# Patient Record
Sex: FEMALE | Race: WHITE
[De-identification: names, ages, dates, MRNs, and addresses within clinical notes are randomized per-mention and may not be internally consistent; named-entity substitution may affect disease eponyms.]

---

## 2017-01-20 ENCOUNTER — HOSPITAL ENCOUNTER (OUTPATIENT)
Dept: HOSPITAL 80 - BMCIMAGING | Age: 77
End: 2017-01-20
Attending: GENERAL ACUTE CARE HOSPITAL
Payer: COMMERCIAL

## 2017-01-20 DIAGNOSIS — Z12.31: Primary | ICD-10-CM

## 2017-01-20 PROCEDURE — G0202 SCR MAMMO BI INCL CAD: HCPCS

## 2017-01-20 NOTE — MA
Screening Digital Mammogram



Clinical Indications: Routine screening.



Technique:  Standard cephalocaudal and mediolateral oblique projections are obtained.  This examinati
on is processed by the Klip.inD computer aided detection system. 



Comparison: December 2015, December 2014, December 2013 and November 2012



Breast density: B; There are scattered fibroglandular densities.



Findings: CAD was reviewed. There is subtle new architectural distortion and increasing density in th
e outer left breast seen only on the cc view. The remainder of the left and right breast are stable..




Impression: Subtle developing density outer left breast. 



BI-RADS 0: additional imaging required outer left breast



Recommendation:  Spot compression view in the CC projection. If persistent, proceed to ultrasound for
 further characterization and localization purposes..



[facility] will send a result letter to the patient.



Negative mammography should not preclude additional workup of a clinically suspicious finding.



The patient's information is entered into a reminder system with a target due date for her next mammo
gram.

## 2017-02-07 ENCOUNTER — HOSPITAL ENCOUNTER (OUTPATIENT)
Dept: HOSPITAL 80 - BMCIMAGING | Age: 77
End: 2017-02-07
Attending: GENERAL ACUTE CARE HOSPITAL
Payer: COMMERCIAL

## 2017-02-07 DIAGNOSIS — R92.8: Primary | ICD-10-CM

## 2017-02-07 DIAGNOSIS — Z51.81: ICD-10-CM

## 2017-02-07 DIAGNOSIS — M05.89: ICD-10-CM

## 2017-02-07 PROCEDURE — G0206 DX MAMMO INCL CAD UNI: HCPCS

## 2017-02-07 NOTE — MA
Left diagnostic mammogram



Indication: Subtle new distortion and increasing density outer left breast on the CC view.



Technique: Spot compressed CC and true lateral views of the left breast.



Comparison: Screening mammograms dating back to November 2010



Findings: The distortion completely resolves on the additional views. A benign band of asymmetric tis
slick in the outer left breast has a similar appearance to the 2011 and 2010 mammograms. No underlying 
mass or architectural distortion. A benign lymph node in the deep upper-outer left breast is unchange
d.



Impression: Benign dense superimposed fibroglandular tissue in the upper-outer left breast. No malign
luis. 



BI-RADS 2: Benign finding.



Recommendation: Resume routine annual screening in January 2018, unless otherwise clinically indicate
d. 



Patient was notified of the negative results and recommendations at time of study completion.

## 2017-09-08 ENCOUNTER — HOSPITAL ENCOUNTER (OUTPATIENT)
Dept: HOSPITAL 80 - BMCIMAGING | Age: 77
End: 2017-09-08
Attending: INTERNAL MEDICINE
Payer: COMMERCIAL

## 2017-09-08 DIAGNOSIS — R93.8: ICD-10-CM

## 2017-09-08 DIAGNOSIS — J44.9: Primary | ICD-10-CM

## 2017-09-11 ENCOUNTER — HOSPITAL ENCOUNTER (OUTPATIENT)
Dept: HOSPITAL 80 - CIMAGING | Age: 77
End: 2017-09-11
Attending: INTERNAL MEDICINE
Payer: COMMERCIAL

## 2017-09-11 DIAGNOSIS — K86.9: ICD-10-CM

## 2017-09-11 DIAGNOSIS — R91.8: Primary | ICD-10-CM

## 2017-09-11 DIAGNOSIS — I71.2: ICD-10-CM

## 2017-09-11 DIAGNOSIS — K57.92: ICD-10-CM

## 2017-09-13 ENCOUNTER — HOSPITAL ENCOUNTER (OUTPATIENT)
Dept: HOSPITAL 80 - FIMAGING | Age: 77
End: 2017-09-13
Attending: INTERNAL MEDICINE
Payer: COMMERCIAL

## 2017-09-13 DIAGNOSIS — K86.2: Primary | ICD-10-CM

## 2017-09-13 PROCEDURE — A9585 GADOBUTROL INJECTION: HCPCS

## 2017-09-13 PROCEDURE — 74183 MRI ABD W/O CNTR FLWD CNTR: CPT

## 2017-09-15 ENCOUNTER — HOSPITAL ENCOUNTER (OUTPATIENT)
Dept: HOSPITAL 80 - FSGY | Age: 77
Discharge: HOME | End: 2017-09-15
Payer: COMMERCIAL

## 2017-09-15 VITALS — HEART RATE: 73 BPM

## 2017-09-15 VITALS — OXYGEN SATURATION: 97 % | SYSTOLIC BLOOD PRESSURE: 150 MMHG | DIASTOLIC BLOOD PRESSURE: 76 MMHG

## 2017-09-15 VITALS — TEMPERATURE: 97.7 F

## 2017-09-15 VITALS — RESPIRATION RATE: 19 BRPM

## 2017-09-15 DIAGNOSIS — R93.5: ICD-10-CM

## 2017-09-15 DIAGNOSIS — J44.9: ICD-10-CM

## 2017-09-15 DIAGNOSIS — K29.70: ICD-10-CM

## 2017-09-15 DIAGNOSIS — K86.9: ICD-10-CM

## 2017-09-15 DIAGNOSIS — K86.2: Primary | ICD-10-CM

## 2017-09-15 DIAGNOSIS — M06.9: ICD-10-CM

## 2017-09-15 DIAGNOSIS — F17.210: ICD-10-CM

## 2017-09-15 DIAGNOSIS — K22.8: ICD-10-CM

## 2017-09-15 DIAGNOSIS — K31.7: ICD-10-CM

## 2017-09-15 PROCEDURE — 0DB98ZX EXCISION OF DUODENUM, VIA NATURAL OR ARTIFICIAL OPENING ENDOSCOPIC, DIAGNOSTIC: ICD-10-PCS

## 2017-09-15 PROCEDURE — 0DB68ZX EXCISION OF STOMACH, VIA NATURAL OR ARTIFICIAL OPENING ENDOSCOPIC, DIAGNOSTIC: ICD-10-PCS

## 2017-09-15 PROCEDURE — 0F9G4ZX DRAINAGE OF PANCREAS, PERCUTANEOUS ENDOSCOPIC APPROACH, DIAGNOSTIC: ICD-10-PCS

## 2017-09-15 PROCEDURE — 0DB38ZX EXCISION OF LOWER ESOPHAGUS, VIA NATURAL OR ARTIFICIAL OPENING ENDOSCOPIC, DIAGNOSTIC: ICD-10-PCS

## 2017-09-15 NOTE — PDGENHP
History & Physical


Chief Complaint: pancreatic cyst


History of Present Illness: 76 year old female presents for evaluation of a 

pancreatic cyst


Pertinent Past, Social, Family History: PMHx: COPD, RA.  FaMHx: No pancreatic 

cancer


Relevant Physical Exam: HEENT: Anicteric.  CV: RRR +s1s2.  Lungs: CTAB No w/r/

r.  Abd: soft, nt, + BS


Cardiorespiratory Assessment: ASA 3

## 2017-09-15 NOTE — GIREPORT
[facility]

Surgical Services - Endoscopy Department

_______________________________________________________________________________________________

Patient Name: Mare Puentes                     Procedure Date: 9/15/2017 9:55 AM

MRN: [id number]                                Account Number: [id number]

Patient Type: Outpatient                      Attending  MD/ ER Physician: Robert Bender MD

_______________________________________________________________________________________________

 

Procedure:                    Upper EUS

Indications:                  Pancreatic cyst on MRI

Patient Profile:              76 year old female presents for evaluation of pancreatic cyst.

Providers:                    Robert Bender MD

Medicines:                    Monitored Anesthesia Care

Complications:                No immediate complications. Estimated blood loss: Minimal.

_______________________________________________________________________________________________

Findings:                     Endoscopic Finding :

                              The Z-line was irregular and was found at the gastroesophageal 

                              junction.

                              Patchy mildly erythematous mucosa was found in the gastric body 

                              and in the gastric antrum. Biopsies were taken with a cold 

                              forceps for histology.

                              A few diminutive sessile polyps were found on the greater 

                              curvature of the stomach.

                              A single large sessile polyp was found in the second portion of 

                              the duodenum. The polyp was 2.5cm x 4.0cm. Biopsies were taken 

                              with a cold forceps for histology.

                              Endosonographic Finding :

                              An anechoic lesion suggestive of a cyst was identified in the 

                              pancreatic head. It does not communicate with the pancreatic 

                              duct. The lesion measured 25 mm by 40 mm in maximal 

                              cross-sectional diameter. There was a single compartment thinly 

                              septated. The outer wall of the lesion was thin. There was 

                              internal debris within the fluid-filled cavity. Fine needle 

                              aspiration for cytology was performed. Color Doppler imaging 

                              was utilized prior to needle puncture to confirm a lack of 

                              significant vascular structures within the needle path. One 

                              pass was made with the 25 gauge needle using a transduodenal 

                              approach. Approc A stylet was used. A cytologist was present 

                              and performed a preliminary cytologic examination. Final 

                              cytology results are pending.

                              Pancreatic parenchymal abnormalities were noted in the entire 

                              pancreas. These consisted of hyperechoic foci. A few dilated 

                              side branches were noted. The pancreatic duct wall was not 

                              hyperechoic.

                              There was no sign of significant endosonographic abnormality in 

                              the entire main bile duct. The maximum diameter of the duct was 

                              4 mm. No cysts and no stones were identified.

                              No lymphadenopathy seen.

                                                                                               

Estimated Blood Loss:         Estimated blood loss was minimal.

Post Op Diagnosis:            - Z-line irregular, at the gastroesophageal junction.

                              - Erythematous mucosa in the gastric body and antrum. Biopsied.

                              - A few gastric polyps.

                              - A single duodenal polyp. Biopsied and NOT removed. Will need 

                              endoscopic mucosal resection dependent on biopsy results.

                              - A cystic lesion was seen in the pancreatic head. Fine needle 

                              aspiration performed.

                              - Pancreatic parenchymal abnormalities consisting of 

                              hyperechoic foci were noted in the entire pancreas.

                              - There was no sign of significant pathology in the entire main 

                              bile duct.

Recommendation:               - Discharge patient to home (with escort).

                              - Await cytology results and await path results.

                              - Perform an upper GI endoscopy in 6 weeks.

                              - Await pathology results.

                              - Clear liquid diet.

                              - Ciprofloxacin 500 mg PO BID x 5 days.

                              - Thagerardo you for allowing me to participate in the care

                                                                                               

Attending Participation:

     I personally performed the entire procedure.

                                                                                               

 

Robert Bender MD

_____________

Robert Bender MD

9/15/2017 11:02:52 AM

Number of Addenda: 0

 

Note Initiated On: 9/15/2017 9:55 AM

Total Procedure Duration Time 0 hours 32 minutes 11 seconds 

[email or web address]

## 2017-09-15 NOTE — PDANEPAE
ANE History of Present Illness


77 yo F w pancreatic mass here for EGD/EUS








ANE Past Medical History





- Cardiovascular History


Hx Hypertension: No


Hx Arrhythmias: No


Hx Chest Pain: No


Hx Coronary Artery / Peripheral Vascular Disease: No


Hx CHF / Valvular Disease: No


Hx Palpitations: No


Cardiovascular History Comment: BULGE IN AORTA ON CT 9-11-17 - FU IN 6 MOS





- Pulmonary History


Hx COPD: Yes


Hx Asthma/Reactive Airway Disease: No


Hx Recent Upper Respiratory Infection: No


Hx Oxygen in Use at Home: No


Hx Sleep Apnea: No


Sleep Apnea Screening Result - Last Documented: Negative


Pulmonary History Comment: DX COPD 5 YRS AGO- WELL MANAGED W/COMBIVENT.  

PINPOINT SPOTS IN LUNGS PER CT SCAN 9-11-17





- Neurologic History


Hx Cerebrovascular Accident: No


Hx Seizures: No


Hx Dementia: No





- Endocrine History


Hx Diabetes: No


Endocrine History Comment: PANCREAS NODULE ON CT 9-11-17





- Renal History


Hx Renal Disorders: Yes


Renal History Comment: FREQUENCY





- Liver History


Hx Hepatic Disorders: No





- Neurological & Psychiatric Hx


Hx Neurological and Psychiatric Disorders: No





- Cancer History


Hx Cancer: No





- Congenital Disorder History


Hx Congenital Disorders: No





- GI History


Hx Gastrointestinal Disorders: No





- Other Health History


Other Health History: ORAL LESION.  RA ON PREDNISONE AND METHOTREXATE





- Chronic Pain History


Chronic Pain: No





- Surgical History


Prior Surgeries: HYSTERECTOMY.  LAP HILTON.  RICKETTS'S NEUROMA.  BREAST BX





ANE Review of Systems


Review of Systems: 








- Exercise capacity


METS (RN): 4 METS





ANE Patient History





- Allergies


Allergies/Adverse Reactions: 








bacitracin [From Neosporin ([id number]-[id number])] Allergy (Verified 09/12/17 15:17)


 


bacitracin zinc [From Neosporin ([id number]-[id number]-rjfov)] Allergy (Verified 09/12/17 15:

17)


 


neomycin sulfate [From Neosporin ([id number]-ejb-rfigi)] Allergy (Verified 09/12/17 15:

17)


 Other-Enter Comments


NSAIDS (Non-Steroidal Anti-Inflamma Allergy (Verified 09/12/17 15:17)


 Anaphylaxis


polymyxin B [From Neosporin (har-njs-iidvz)] Allergy (Verified 09/12/17 15:17)


 








- Home Medications


Home Medications: 








Combivent Respimat Inhal Spray(*)  09/12/17 [Last Taken 09/15/17 08:55]


Methotrexate  09/12/17 [Last Taken 09/09/17]


Prednisone  09/12/17 [Last Taken 09/13/17]


Z-Sleep  09/12/17 [Last Taken 09/13/17]








- NPO status


NPO Since - Liquids (Date): 09/14/17


NPO Since - Liquids (Time): 23:00


NPO Since - Solids (Date): 09/14/17


NPO Since - Solids (Time): 19:30





- Anes Hx


Anes Hx: no prior problems





- Smoking Hx


Smoking Status: Heavy smoker (60 pack years)





- Alcohol Use


Alcohol Use: None





- Family Anes Hx


Family Anes Hx: none





ANE Labs/Vital Signs





- Vital Signs


Blood Pressure: 170/90


Heart Rate: 78


Respiratory Rate: 20


O2 Sat (%): 96


Height: 172.72 cm


Weight: 70.76 kg





ANE Physical Exam





- Airway


Neck exam: FROM


Mallampati Score: Class 2


Mouth exam: poor dentition, dentures


Mouth image: 


  __________________________














  __________________________





 1 - missing





 2 - missing








- Pulmonary


Pulmonary: no respiratory distress, expiratory wheeze





- Cardiovascular


Cardiovascular: regular rate and rhythym





- ASA Status


ASA Status: III





ANE Anesthesia Plan


Anesthesia Plan: general endotracheal anesthesia (backup), GA with mask

## 2017-09-18 LAB — Lab: 86 U/L

## 2018-01-24 ENCOUNTER — HOSPITAL ENCOUNTER (OUTPATIENT)
Dept: HOSPITAL 80 - BMCIMAGING | Age: 78
End: 2018-01-24
Attending: INTERNAL MEDICINE
Payer: COMMERCIAL

## 2018-01-24 DIAGNOSIS — Z12.31: Primary | ICD-10-CM

## 2018-03-13 ENCOUNTER — HOSPITAL ENCOUNTER (OUTPATIENT)
Dept: HOSPITAL 80 - FSGY | Age: 78
Setting detail: OBSERVATION
LOS: 1 days | Discharge: HOME | End: 2018-03-14
Attending: INTERNAL MEDICINE | Admitting: FAMILY MEDICINE
Payer: COMMERCIAL

## 2018-03-13 DIAGNOSIS — K44.9: ICD-10-CM

## 2018-03-13 DIAGNOSIS — K92.1: Primary | ICD-10-CM

## 2018-03-13 DIAGNOSIS — K64.8: ICD-10-CM

## 2018-03-13 DIAGNOSIS — J44.9: ICD-10-CM

## 2018-03-13 DIAGNOSIS — K26.9: ICD-10-CM

## 2018-03-13 DIAGNOSIS — D62: ICD-10-CM

## 2018-03-13 DIAGNOSIS — R42: ICD-10-CM

## 2018-03-13 DIAGNOSIS — Z98.890: ICD-10-CM

## 2018-03-13 DIAGNOSIS — F17.210: ICD-10-CM

## 2018-03-13 LAB
INR PPP: 1.08 (ref 0.83–1.16)
PLATELET # BLD: 183 10^3/UL (ref 150–400)
PROTHROMBIN TIME: 14.2 SEC (ref 12–15)

## 2018-03-13 PROCEDURE — G0378 HOSPITAL OBSERVATION PER HR: HCPCS

## 2018-03-13 PROCEDURE — 43235 EGD DIAGNOSTIC BRUSH WASH: CPT

## 2018-03-13 PROCEDURE — 45330 DIAGNOSTIC SIGMOIDOSCOPY: CPT

## 2018-03-13 PROCEDURE — G0379 DIRECT REFER HOSPITAL OBSERV: HCPCS

## 2018-03-13 RX ADMIN — PANTOPRAZOLE SODIUM SCH MG: 40 INJECTION, POWDER, FOR SOLUTION INTRAVENOUS at 20:00

## 2018-03-13 NOTE — GHP
[f rep st]



                                                            HISTORY AND PHYSICAL





DATE OF ADMISSION:  03/13/2018



CHIEF COMPLAINT:  Blood in stool.



HISTORY OF PRESENT ILLNESS:  This is a 77-year-old female, who underwent an EGD with endoscopic remov
al of a duodenal polyp last week.  Presented to the hospital today after she had an episode of hemato
chezia and the feeling of lightheadedness this morning. 



Last night the patient had some chili, which she felt did not agree with her.  She vomited multiple t
imes last night.  Her vomitus was described as nonbloody and did not have any appearance of coffee-gr
ound emesis.  This morning, she developed some bright red blood per rectum and felt lightheaded.  Brian
arently she was sent directly to the endoscopy suite, where she underwent an EGD and flex sig by Dr. Fink.  The flex sig revealed red maroon stool in the rectum as well as internal hemorrhoids.  The 
EGD showed 1 nonobstructing, nonbleeding duodenal ulcer with pigmented material and stigmata of recen
t bleeding.  During the time of my exam, the patient was seen in the postoperative area.  She is deny
ing any abdominal pain.  She is no longer feeling nauseous.



PAST MEDICAL HISTORY:  

1.  Tobacco abuse.

2.  Chronic obstructive pulmonary disease.



PAST SURGICAL HISTORY:  Cholecystectomy.



HOME MEDICATIONS:  Refer to DLS for details.



ALLERGIES:  Bacitracin, NSAIDs, neomycin, polymyxin.



SOCIAL HISTORY:  The patient smokes.  She denies any current alcohol use.  She denies any illicit balta
g use.



FAMILY HISTORY:  Significant for mother with multiple intestinal surgeries, possible colon cancer.



REVIEW OF SYSTEMS:  Comprehensive 10-point review of systems was done and is negative, except for as 
mentioned in the HPI.



PHYSICAL EXAMINATION:  VITAL SIGNS:  Blood pressure 133/65, pulse of 74, respiratory rate 21, O2 sat 
100% on 2 L, temperature afebrile.  GENERAL:  No acute distress.  HEAD:  Normocephalic, atraumatic.  
EYES:  PERRLA.  Sclerae anicteric.  MOUTH:  Moist mucous membranes.  NECK:  Supple.  No lymphadenopat
hy.  CARDIOVASCULAR:  S1-S2.  No JVD.  No lower extremity edema.  PULMONARY:  Lungs are clear to ausc
ultation bilaterally.  No wheezes, rales, or rhonchi.  ABDOMEN:  Soft, nontender, nondistended.  No g
uarding or rebound tenderness.  Normoactive bowel sounds.  EXTREMITIES:  No clubbing or cyanosis.  NE
URO:  Cranial nerves 2-12 grossly intact.  No focal motor or sensory deficits.  SKIN:  Without rash.



DIAGNOSTICS:  WBC is 11.1, hemoglobin 12.8, hematocrit 37.6, down from a crit of 49.3 on 02/27/2018. 
 Sodium 144, potassium 4, chloride 107, BUN 27, creatinine 0.5, glucose 82.



ASSESSMENT:  This is a 77-year-old female who underwent esophagogastroduodenoscopy and endoscopic rem
oval of a duodenal polyp last week, presenting with suspected acute gastrointestinal bleed with acute
 blood loss anemia.



PLAN:  Once again, the patient has already undergone EGD and sigmoidoscopy.  At this point, she will 
be observed and closely monitored for further bleeding.  We will start Protonix 40 mg IV b.i.d. and c
heck a type and cross.  We will also obtain baseline coags.  If she continues to be hemodynamically s
table and not bleeding, she can likely be discharged in the morning. 



Chemical DVT prophylaxis is contraindicated in the setting of bleeding.  We will order SCDs.





Job #:  [id number]

## 2018-03-13 NOTE — POSTANESTH
Post Anesthetic Evaluation


Cardiovascular Status: Normal, Stable, Similar to Pre-Op Cond


Respiratory Status: Similar to Pre-op Cond.


Level of Consciousness/Mental Status: Can Participate in Eval


Pain Control: Adequate, Prn Tx Ordered


Nausea/Vomiting Control: Adequate, Prn Tx Ordered


Complications Possibly Related to Anesthesia: None Noted

## 2018-03-13 NOTE — GIREPORT
[facility]

Surgical Services - Endoscopy Department

_____________________________________________________________________________________________________
_______

Patient Name: Mare Puentes                            Procedure Date: 3/13/2018 11:23 AM

MRN: [id number]                                       Account Number: [id number]

Patient Type: Outpatient                             Attending  MD/ ER Physician: Rios Fink MD


_____________________________________________________________________________________________________
_______

 

Procedure:                    Flexible Sigmoidoscopy

Indications:                  Hematochezia, Melena

Providers:                    Rios Fink MD

Medicines:                    Sedation Required Anesthesia Staff Assistance

Complications:                No immediate complications.

Description of Procedure:     After obtaining informed consent, the endoscope was passed under direct
 

                              vision. Throughout the procedure, the patient's blood pressure, pulse, 
and 

                              oxygen saturations were monitored continuously. The Colonoscope was 

                              introduced through the anus and advanced to the rectum. The flexible 

                              sigmoidoscopy was accomplished without difficulty. The patient tolerate
d the 

                              procedure well. The quality of the bowel preparation was adequate.

Findings:                     Internal hemorrhoids were found during retroflexion. The hemorrhoids we
re 

                              medium-sized.

                              Red maroon blood was found in the rectum.

Estimated Blood Loss:         Estimated blood loss: none.

Post Op Diagnosis:            - Internal hemorrhoids.

                              - Blood in the rectum.

                              - No specimens collected.

Recommendation:               - Patient being admitted for observation and managment of an UGI bleed.


                              - Thank you for allowing me to participate in the care of your patient.


Attending Participation:

     I personally performed the entire procedure.

 

Rios Fink MD

__________________

Rios Fink MD

3/13/2018 11:38:58 AM

This report has been signed electronicallyStalonso Fink MD

Number of Addenda: 0

 

Note Initiated On: 3/13/2018 11:23 AM

Total Procedure Duration Time 0 hours 0 minutes 53 seconds 

[email or web address]

## 2018-03-13 NOTE — GCON
[f rep st]



                                                                    CONSULTATION





DATE OF CONSULTATION:  03/13/2018



CHIEF COMPLAINT:  Hematochezia, lightheadedness.



HISTORY OF PRESENT ILLNESS:  This 77-year-old woman had been directed to the emergency department.  S
he was sent up to endoscopy directly.  She had undergone an upper endoscopy last week with Dr. Bender. 
 She had a large duodenal polyp that was adenomatous.  She had this snare excised with mucosal resect
ion.  Last evening, she felt unwell, she had some nausea and vomiting.  She did not vomit any hematem
esis.  She denies any bright red blood or coffee-ground material.  She felt unwell most of the night.
  In the morning, she had 2 episodes of bright red blood per rectum. She was feeling somewhat lighthe
aded and dizzy.  She was directed to go to the emergency department by our office.  She presented up 
to endoscopy.  She was not tachycardic in endoscopy, had normal blood pressure.  Her hematocrit was s
omewhat low at 37% with a mildly elevated BUN and normal creatinine suggesting upper GI bleed.  It wa
s recommended she go directly to the endoscopy unit.



PAST MEDICAL HISTORY:  Remarkable for tobacco abuse, COPD.



PAST SURGICAL HISTORY:  Remarkable cholecystectomy.



ALLERGIES:  Bactrim, NSAIDs, neomycin, polymyxin.



SOCIAL HISTORY:  She is a nonsmoker.  No significant alcohol or illicit drugs.



FAMILY HISTORY:  Possible colon cancer in her mother.  Otherwise, negative per chief complaint.



MEDICATIONS:  Patient describes taking prednisone, methotrexate and inhaler as an outpatient.



REVIEW OF SYSTEMS:  Negative 10 systems other than mentioned HPI.



PHYSICAL EXAM:  VITAL SIGNS:  129/72, pulse of 75, respiratory rate 21, temperature 36.5 Celsius.  GE
NERAL: Very pleasant woman in no acute distress.  HEENT:  Normocephalic, atraumatic.  EOMI.  NECK:  S
upple.  No cervical adenopathy.  LUNGS:  Clear.  CARDIAC: Normal S1, S2 without murmur.  ABDOMEN:  So
ft, benign nontender, no hepatosplenomegaly.  EXTREMITIES:  Without clubbing, cyanosis, edema.  SKIN:
  Warm, dry, intact.  NEURO:  Nonfocal.  PSYCH: Normal affect.  Oriented to person, place, and time.



LABORATORY DATA:  White count 11.1, hemoglobin 12.8, hematocrit 37.6. Serum chemistries:  Serum sodiu
m 144, potassium 4.0, chloride 107, CO2 28, BUN 27, creatinine 0.9.



IMPRESSION:  A 77-year-old woman who has had a recent large polypectomy in the duodenum now presents 
with some symptoms of lightheadedness, dizziness and reported passage of blood per rectum.  She descr
ibed bright red blood per rectum.  However, she does have a slight drop in her hematocrit with elevat
ed BUN of 27, suggesting a possible upper GI bleed.  Given recent polypectomy in the duodenum, would 
recommend urgent upper endoscopy.  We will also proceed with flexible sigmoidoscopy at the time of up
per endoscopy.



RECOMMENDATIONS:  

1.  Proceed with urgent upper endoscopy and flexible sigmoidoscopy.

2.  Anticipate admission to the hospital for observation.





Job #:  [id number]

## 2018-03-13 NOTE — GIREPORT
[facility]

Surgical Services - Endoscopy Department

_____________________________________________________________________________________________________
_______

Patient Name: Mare Puentes                            Procedure Date: 3/13/2018 11:09 AM

MRN: [id number]                                       Account Number: [id number]

Patient Type: Outpatient                             Attending  MD/ ER Physician: Rios Fink MD


_____________________________________________________________________________________________________
_______

 

Procedure:                    Upper GI endoscopy

Indications:                  Acute post hemorrhagic anemia, Hematochezia, Recent EGD with duodenal 

                              polypectomy.

Providers:                    Rios Fink MD

Medicines:                    Sedation Required Anesthesia Staff Assistance

Complications:                No immediate complications.

Description of Procedure:     After obtaining informed consent, the endoscope was passed under direct
 

                              vision. Throughout the procedure, the patient's blood pressure, pulse, 
and 

                              oxygen saturations were monitored continuously. The Endoscope was intro
duced 

                              through the mouth, and advanced to the second part of duodenum. The [facility]
er GI 

                              endoscopy was accomplished without difficulty. The patient tolerated th
e 

                              procedure well.

Findings:                     The examined esophagus was normal.

                              A small hiatal hernia was present.

                              The entire examined stomach was normal. No bleed see in the stomach.

                              One non-obstructing non-bleeding cratered duodenal ulcer with pigmented
 

                              material was found in the second portion of the duodenum. The lesion wa
s 20 

                              mm in largest dimension.

Estimated Blood Loss:         Estimated blood loss: none.

Post Op Diagnosis:            - Normal esophagus.

                              - Normal stomach.

                              - One non-obstructing non-bleeding duodenal ulcer with pigmented materi
al. 

                              Stigmata of recent bleeding.

                              - No specimens collected.

Recommendation:               - Recommend admission to the hosptial for observation in setting of acu
te 

                              GIB.

                              - Serial H and H

                              - Use Protonix (pantoprazole) 40 mg IV BID.

                              - Clear liquid diet.

                              - Type and Screen

                              - Thank you for allowing me to participate in the care of your patient.


Attending Participation:

     I personally performed the entire procedure.

 

Rios Fink MD

__________________

Rios Fink MD

3/13/2018 11:35:33 AM

This report has been signed electronicallyStalonso Fink MD

Number of Addenda: 0

 

Note Initiated On: 3/13/2018 11:09 AM

[email or web address]

## 2018-03-13 NOTE — PDANEPAE
ANE History of Present Illness





EGD





ANE Past Medical History





- Cardiovascular History


Hx Hypertension: No


Hx Arrhythmias: No


Hx Chest Pain: No


Hx Coronary Artery / Peripheral Vascular Disease: No


Hx CHF / Valvular Disease: No


Hx Palpitations: No


Cardiovascular History Comment: BULGE IN AORTA ON CT 9-11-17 - FU IN 6 MOS





- Pulmonary History


Hx COPD: Yes


Hx Asthma/Reactive Airway Disease: No


Hx Recent Upper Respiratory Infection: No


Hx Oxygen in Use at Home: No


Hx Sleep Apnea: No


Pulmonary History Comment: DX COPD 5 YRS AGO- WELL MANAGED W/COMBIVENT.  

PINPOINT SPOTS IN LUNGS PER CT SCAN 9-11-17





- Neurologic History


Hx Cerebrovascular Accident: No


Hx Seizures: No


Hx Dementia: No





- Endocrine History


Hx Diabetes: No


Endocrine History Comment: PANCREAS NODULE ON CT 9-11-17





- Renal History


Hx Renal Disorders: Yes


Renal History Comment: FREQUENCY





- Liver History


Hx Hepatic Disorders: No





- Neurological & Psychiatric Hx


Hx Neurological and Psychiatric Disorders: No





- Cancer History


Hx Cancer: No





- Congenital Disorder History


Hx Congenital Disorders: No





- GI History


Hx Gastrointestinal Disorders: Yes


Gastrointestinal History Comment: small bowel polp





- Other Health History


Other Health History: ORAL LESION.  RA ON PREDNISONE AND METHOTREXATE





- Chronic Pain History


Chronic Pain: No





- Surgical History


Prior Surgeries: HYSTERECTOMY.  LAP HILTON.  RICKETTS'S NEUROMA.  BREAST BX.  polp 

on small bowel removing in parts





ANE Review of Systems


Review of systems is: negative


Review of Systems: 








- Exercise capacity


METS (RN): 4 METS





ANE Patient History





- Allergies


Allergies/Adverse Reactions: 








bacitracin [From Neosporin (cpy-rfl-ctgek)] Allergy (Verified 09/12/17 15:17)


 


bacitracin zinc [From Neosporin (uqf-bzx-xwfku)] Allergy (Verified 09/12/17 15:

17)


 


neomycin sulfate [From Neosporin (twx-xno-oxgnw)] Allergy (Verified 09/12/17 15:

17)


 Other-Enter Comments


NSAIDS (Non-Steroidal Anti-Inflamma Allergy (Verified 09/12/17 15:17)


 Anaphylaxis


polymyxin B [From Neosporin (hza-mvi-ayuse)] Allergy (Verified 09/12/17 15:17)


 








- Home Medications


Home medications: home medication list seen and reviewed


Home Medications: 








Combivent Respimat Inhal Spray(*)  09/12/17 [Last Taken 03/13/18 08:30]


Methotrexate  09/12/17 [Last Taken 03/10/18]


Prednisone  09/12/17 [Last Taken 03/12/18]


Z-Sleep  09/12/17 [Last Taken 03/12/18]








- NPO status


NPO Since - Liquids (Date): 03/13/18


NPO Since - Liquids (Time): 09:00


NPO Since - Solids (Date): 03/12/18


NPO Since - Solids (Time): 19:00





- Anes Hx


Anes Hx: post operative nausea and vomiting





- Smoking Hx


Smoking Status: Heavy smoker





- Family Anes Hx


Family Anes Hx: none





ANE Labs/Vital Signs





- Labs


Result Diagrams: 


 03/13/18 10:43





 03/13/18 10:43





- Vital Signs


Height: 172.72 cm


Weight: 70.307 kg





ANE Physical Exam





- Airway


Neck exam: FROM


Mallampati Score: Class 2


Mouth exam: dentures





- Pulmonary


Pulmonary: no respiratory distress





- Cardiovascular


Cardiovascular: regular rate and rhythym





- ASA Status


ASA Status: III





ANE Anesthesia Plan


Total IV Anesthesia: Yes

## 2018-03-13 NOTE — PDGENHP
History & Physical


Chief Complaint: CC: Hematochezia


History of Present Illness: 77 year old women with recent EGD with endoscopic 

removal of duodenal polyp. Patient with hematochezia and light headedness. 

Patient was instructed to go to ER for evaluation. Patient was sent to Endo 

directly.  Patient was hemodynamically stable in Endo.  Patient presents now 

for EGD and flex sig to evaluate bleeding.


Pertinent Past, Social, Family History: HTN


Relevant Physical Exam: Lungs Clear Cardiac Normal

## 2018-03-14 VITALS
TEMPERATURE: 98 F | DIASTOLIC BLOOD PRESSURE: 63 MMHG | HEART RATE: 82 BPM | RESPIRATION RATE: 14 BRPM | SYSTOLIC BLOOD PRESSURE: 144 MMHG | OXYGEN SATURATION: 100 %

## 2018-03-14 RX ADMIN — PANTOPRAZOLE SODIUM SCH MG: 40 INJECTION, POWDER, FOR SOLUTION INTRAVENOUS at 08:21

## 2018-03-14 NOTE — ASMTCMCOM
CM Note

 

CM Note                       

Notes:

Spoke w/RN, anticipate will dc home independent when medically stable. CM available for any 

changes.



DC Plan: Independent

 

Date Signed:  03/14/2018 10:06 AM

Electronically Signed By:Cleo Osuna RN

## 2018-03-14 NOTE — HOSPPROG
Hospitalist Progress Note


Assessment/Plan: 





patient is a 76 y/o admitted due to concerns of blood in her stool.  Reviewed 

her care w Dr Fink.





*GI bleed


-h/h overall stable, have trended down a bit


-PPI bid x 2 weeks, then daily x 6 weeks


-OP colonoscopy





*nicotine dependence


-cessation recommended, patch





*COPD





*plan; dc home, further f/u with Dr Bender


Subjective: Mare is feeling well, no complaints.


Objective: 


 Vital Signs











Temp Pulse Resp BP Pulse Ox


 


 36.7 C   76   18   147/81 H  94 


 


 03/14/18 07:18  03/14/18 07:18  03/14/18 07:18  03/14/18 07:18  03/14/18 07:18








 Laboratory Results





 03/14/18 05:55 





 03/13/18 10:43 





 











 03/13/18 03/14/18 03/15/18





 05:59 05:59 05:59


 


Intake Total  1000 


 


Output Total  0 


 


Balance  1000 








 











PT  14.2 SEC (12.0-15.0)   03/13/18  14:15    


 


INR  1.08  (0.83-1.16)   03/13/18  14:15    














- Physical Exam


Constitutional: no apparent distress, appears nourished, not in pain


Eyes: PERRL


Ears, Nose, Mouth, Throat: hearing normal


Cardiovascular: regular rate and rhythym


Respiratory: no respiratory distress


Gastrointestinal: normoactive bowel sounds


Skin: warm


Musculoskeletal: full muscle strength


Neurologic: AAOx3


Psychiatric: interacting appropriately





ICD10 Worksheet


Patient Problems: 


 Problems











Problem Status Onset


 


Melena Acute

## 2018-03-14 NOTE — GDS
[f 
rep st]



                                                             DISCHARGE SUMMARY





DISCHARGE DIAGNOSES:  

1.  Likely upper gastrointestinal bleed.

2.  Anemia due to this.

3.  Nicotine dependence.

4.  Chronic obstructive pulmonary disease.



HISTORY:  Briefly, the patient is a 77-year-old female, who underwent EGD with 
endoscopic removal of a duodenal polyp last week.  She presented to the 
hospital after she had an episode of hematochezia and the feeling of 
lightheadedness in the morning.  She underwent an EGD and flex sig by Dr. Luciano.  The flex sig revealed maroon stool in the rectum, as well as internal 
hemorrhoids.  The EGD showed a nonobstructing, nonbleeding duodenal ulcer.  She 
was monitored overnight.  Her hemoglobin and hematocrit trended down, but have 
been stable.  The plan is for her to be on a PPI daily for 2 weeks, and then 
daily x6 weeks, and further follow up with Dr. Bender in the outpatient setting.



HOSPITAL COURSE BY PROBLEM:  

1.  Upper GI bleed.  She will stay here through the morning to make sure she is 
feeling okay.  Vital signs are stable.  Hemoglobin and hematocrit have trended 
down, but are not worsen.  Will recommend that she see her primary care 
provider and get a hemoglobin and hematocrit next week.  She will be on a PPI 
b.i.d. x2 weeks, then daily x6 weeks.  She needs an outpatient colonoscopy.  I 
discussed this with her.

2.  Nicotine dependence.  Cessation recommended.  She has a patch on.

3.  COPD, not on oxygen.  Overall stable.



DISCHARGE CONDITION:  Stable.  Blood pressure is 147/81, heart rate is 76, 
respiratory rate is 18, O2 sats on room air 94%, temperature 36.7 Celsius.



MEDICATIONS AT DISCHARGE:  Please see the EMR.



DISCHARGE INSTRUCTIONS:  

1.  Recommending she talk to Dr. Bender in regard about restarting her prednisone 
and her methotrexate.

2.  If she develops any further bleeding, to return to the ER.



Copy requested to:

Dr. Bender



Job #:  [id number]

MTDD

## 2018-03-14 NOTE — SOAPPROG
SOAP Progress Note


Assessment/Plan: 


Assessment:





Patient has an episode of dark red stool yesterday. Was described as a small 

amount.  Patient without abdominal pain, no light headiness or dizziness.





She had a slight drop in Hct since admission. 








Plan:





1. Advance to regular diet





2. Pantoprazole 40 mg PO BID x 2 weeks then 40 mg a day x 6 weeks





3. Ok for discharge home later today.  Will need follow up with Dr. Bender.





4. I would recommend an outpatient colonoscopy at some point in the near 

future. 





03/14/18 08:41





Subjective: 





CC: GI Bleed





Patient without pain. Had one episode of dark red stool since admission. No 

abdominal pain, no light headedness. 


Objective: 





 Vital Signs











Temp Pulse Resp BP Pulse Ox


 


 36.7 C   76   18   147/81 H  94 


 


 03/14/18 07:18  03/14/18 07:18  03/14/18 07:18  03/14/18 07:18  03/14/18 07:18








 Laboratory Results





 03/14/18 05:55 





 03/13/18 10:43 





 











 03/13/18 03/14/18 03/15/18





 05:59 05:59 05:59


 


Intake Total  1000 


 


Output Total  0 


 


Balance  1000 








 











PT  14.2 SEC (12.0-15.0)   03/13/18  14:15    


 


INR  1.08  (0.83-1.16)   03/13/18  14:15    














 











Generic Name Dose Route Start Last Admin





  Trade Name Freq  PRN Reason Stop Dose Admin


 


Acetaminophen  650 mg  03/13/18 12:32  





  Tylenol  PO  09/09/18 12:31  





  Q6 PRN   





  Pain, Mild/Fever, Can Take PO   


 


Albuterol/Ipratropium  1 inh  03/14/18 09:00  





  Combivent Respimat Inhal [place]  IH  09/10/18 08:59  





  BID ASHLEY   


 


Diphenhydramine HCl  25 mg  03/13/18 21:02  03/14/18 00:08





  Benadryl  PO  09/09/18 21:01  25 mg





  HS PRN   Administration





  Sleep/Insomnia   


 


Ondansetron HCl  4 mg  03/13/18 12:32  





  Zofran  IVP  09/09/18 12:31  





  Q4 PRN   





  Nausea/Vomiting, Can't Take PO   


 


Pantoprazole Sodium  40 mg  03/13/18 21:00  03/14/18 08:21





  Protonix  IVP  09/09/18 20:59  40 mg





  BID ASHLEY   Administration


 


Promethazine HCl  12.5 mg  03/13/18 12:32  





  Phenergan  IVP  09/09/18 12:31  





  Q6 PRN   





  Nausea/Vomiting, Can't Take PO   














Discontinued Medications














Generic Name Dose Route Start Last Admin





  Trade Name Ata  PRN Reason Stop Dose Admin


 


Acetaminophen  500 mg  03/13/18 11:41  





  Tylenol  PO  03/13/18 12:41  





  Q6HRS PRN   





  PACU, Pain Mild   


 


Hydrocodone Bitart/Acetaminophen  1 - 2 tab  03/13/18 11:41  





  [place] 5/325  PO  03/13/18 12:41  





  Q4HRS PRN   





  PACU, Pain Moderate   


 


Albuterol  3 ml  03/13/18 11:41  





  Proventil Neb  IH  03/13/18 12:41  





  Q10M PRN   





  PACU, Wheezing   


 


Dexamethasone  4 mg  03/13/18 11:41  





  Decadron Injection  IVP  03/13/18 12:41  





  ONCE PRN   





  PACU, Nausea/Vomiting   


 


Epinephrine HCl  Confirm  03/13/18 10:42  





  Epinephrine  Administered  03/13/18 10:43  





  Dose   





  1 mg   





  .ROUTE   





  .STK-MED ONE   


 


Fentanyl  25 - 100 mcg  03/13/18 11:41  





  Sublimaze  IVP  03/13/18 12:41  





  Q5M PRN   





  PACU, IMMEDIATE Pain control   


 


Lactated Ringer's  1,000 mls @ 0 mls/hr  03/13/18 09:50  03/13/18 10:48





  Lr  IV  03/13/18 09:51  1,000 mls





  ONCE ONE   Administration





  KVO   


 


Lidocaine HCl  0.2 ml  03/13/18 09:50  03/13/18 10:48





  Lidocaine Hcl 1%  ID  03/13/18 11:51  0.2 ml





  ONCE PRN   Administration





  IV Start   


 


Lidocaine HCl  Confirm  03/13/18 11:16  





  Lidocaine Hcl 2%  Administered  03/13/18 11:17  





  Dose   





  100 mg   





  .ROUTE   





  .STK-MED ONE   


 


Naloxone HCl  0.1 mg  03/13/18 11:41  





  Narcan  IVP  03/13/18 12:41  





  Q2M PRN   





  PACU Resp Rate <10/min   


 


Ondansetron HCl  2 - 4 mg  03/13/18 11:41  





  Zofran  IVP  03/13/18 12:41  





  Q10M PRN   





  PACU, Nausea/Vomiting   


 


Oxycodone/Acetaminophen  1 - 2 tab  03/13/18 11:41  





  Percocet 5/325  PO  03/13/18 12:41  





  Q4HRS PRN   





  PACU, Pain Severe   


 


Propofol  Confirm  03/13/18 11:16  





  Diprivan 10 Mg/Ml (Premix)  Administered  03/13/18 11:17  





  Dose   





  500 mg   





  IV   





  .STK-MED ONE   














Physical Exam





- Physical Exam


General Appearance: alert, no apparent distress


Respiratory: lungs clear, normal breath sounds


Cardiac/Chest: regular rate, rhythm


Abdomen: normal bowel sounds, non-tender, soft


Skin: normal color, warm/dry


Neuro/Psych: no motor/sensory deficits, alert, normal mood/affect, oriented x 3





ICD10 Worksheet


Patient Problems: 


 Problems











Problem Status Onset


 


Melena Acute  














- ICD10 Problem Qualifiers


(1) Melena

## 2018-03-28 ENCOUNTER — HOSPITAL ENCOUNTER (OUTPATIENT)
Dept: HOSPITAL 80 - FIMAGING | Age: 78
End: 2018-03-28
Attending: INTERNAL MEDICINE
Payer: COMMERCIAL

## 2018-03-28 DIAGNOSIS — R91.8: Primary | ICD-10-CM

## 2018-03-28 DIAGNOSIS — I71.2: ICD-10-CM

## 2018-03-28 DIAGNOSIS — D18.09: ICD-10-CM

## 2018-03-28 DIAGNOSIS — J43.9: ICD-10-CM

## 2018-03-28 PROCEDURE — 71275 CT ANGIOGRAPHY CHEST: CPT

## 2018-04-12 ENCOUNTER — HOSPITAL ENCOUNTER (OUTPATIENT)
Dept: HOSPITAL 80 - FIMAGING | Age: 78
End: 2018-04-12
Payer: COMMERCIAL

## 2018-04-12 DIAGNOSIS — I71.4: ICD-10-CM

## 2018-04-12 DIAGNOSIS — K86.2: Primary | ICD-10-CM

## 2018-04-12 PROCEDURE — 74160 CT ABDOMEN W/CONTRAST: CPT

## 2018-07-13 ENCOUNTER — HOSPITAL ENCOUNTER (INPATIENT)
Dept: HOSPITAL 80 - CED | Age: 78
LOS: 2 days | Discharge: HOME | DRG: 372 | End: 2018-07-15
Attending: HOSPITALIST | Admitting: INTERNAL MEDICINE
Payer: COMMERCIAL

## 2018-07-13 DIAGNOSIS — M06.9: ICD-10-CM

## 2018-07-13 DIAGNOSIS — Z79.52: ICD-10-CM

## 2018-07-13 DIAGNOSIS — A04.72: Primary | ICD-10-CM

## 2018-07-13 DIAGNOSIS — Z72.0: ICD-10-CM

## 2018-07-13 DIAGNOSIS — N17.9: ICD-10-CM

## 2018-07-13 DIAGNOSIS — E87.6: ICD-10-CM

## 2018-07-13 DIAGNOSIS — J44.9: ICD-10-CM

## 2018-07-13 NOTE — EDPHY
H & P


Stated Complaint: Diarrhea x4 days.


Time Seen by Provider: 07/13/18 20:39


HPI/ROS: 





CHIEF COMPLAINT:  Diarrhea





HISTORY OF PRESENT ILLNESS:  The patient is a 77-year-old immunocompromised 

female who comes to the emergency department complaining of diarrhea for 4 

days.  She has history of rheumatoid arthritis and is on methotrexate and 

prednisone.  She has been on 3 different antibiotics in the last 2 months for a 

chronic wound on her right leg.  She cannot remember the name of the 1st but 

then has been on Levaquin and then clindamycin.  She finished 2 weeks ago.  

About 4 days ago she began having loose stools.  Nonbloody.  She does not 

describe them as watery.  She states that any time she drinks water however it 

makes her have diarrhea.  She is getting dehydrated.  She has not had a fever.  

No vomiting.  No abdominal pain.  No distension.








REVIEW OF SYSTEMS:


Constitutional:  denies: chills, fever, recent illness, recent injury


EENTM: denies: blurred vision, double vision, nose congestion


Respiratory: denies: cough, shortness of breath


Cardiac: denies: chest pain, irregular heart rate, lightheadedness, palpitations


Gastrointestinal/Abdominal:  See HPI


Genitourinary: denies: dysuria, frequency, hematuria, pain


Musculoskeletal: denies: joint pain, muscle pain


Skin: denies: lesions, rash, jaundice, bruising


Neurological: denies: headache, numbness, paresthesia, tingling, dizziness, 

weakness


Hematologic/Lymphatic: denies: blood clots, easy bleeding, easy bruising


Immunologic/allergic: denies: HIV/AIDS, transplant








EXAM:


GENERAL:  Moderate distress.


HEAD:  Atraumatic, normocephalic.


EYES:  Pupils equal round and reactive to light, extraocular movements intact, 

sclera anicteric, conjunctiva are normal.


ENT:  TMs normal, nares patent, oropharynx clear without exudates.  Dry mucous 

membranes.


NECK:  Normal range of motion, supple without lymphadenopathy or JVD.


LUNGS:  Breath sounds clear to auscultation bilaterally and equal.  No wheezes 

rales or rhonchi.


HEART:  Not tachycardic, Regular rate and rhythm without murmurs, rubs or 

gallops.


ABDOMEN:  Soft, nontender, normoactive bowel sounds.  No guarding, no rebound.  

No masses appreciated.


BACK:  No CVA tenderness, no spinal tenderness, step-offs or deformities


EXTREMITIES:  Normal range of motion, no pitting or edema.  No clubbing or 

cyanosis.


NEUROLOGICAL:  Cranial nerves II through XII grossly intact.  Normal speech, 

normal gait.  5/5 strength, normal movement in all extremities, normal sensation


PSYCH:  Normal mood, normal affect.


SKIN:  Warm, dry, normal turgor, no visible rashes or lesions.








Source: Patient


Exam Limitations: No limitations





- Personal History


Current Tetanus/Diphtheria Vaccine: No


Current Tetanus Diphtheria and Acellular Pertussis (TDAP): No





- Medical/Surgical History


Hx Asthma: Yes


Hx Chronic Respiratory Disease: Yes


Hx Diabetes: No


Hx Cardiac Disease: No


Hx Renal Disease: No


Hx Cirrhosis: No


Hx Alcoholism: No


Hx HIV/AIDS: No


Hx Splenectomy or Spleen Trauma: No


Other PMH: hysterectomy, gall blader removed, arthitis, tumor removed on toes, 

angioplasy, COPD.





- Family History


Significant Family History: No pertinent family hx





- Social History


Smoking Status: Heavy smoker


Alcohol Use: Sober


Drug Use: None


Constitutional: 


 Initial Vital Signs











Temperature (C)  36.6 C   07/13/18 20:28


 


Heart Rate  94   07/13/18 20:28


 


Respiratory Rate  16   07/13/18 20:28


 


Blood Pressure  115/62   07/13/18 20:28


 


O2 Sat (%)  95   07/13/18 20:28








 











O2 Delivery Mode               Room Air














Allergies/Adverse Reactions: 


 





bacitracin [From Neosporin (ger-xcc-axfxp)] Allergy (Verified 07/14/18 09:26)


 


bacitracin zinc [From Neosporin (urw-vqk-yrlnd)] Allergy (Verified 07/14/18 09:

26)


 


neomycin sulfate [From Neosporin (qgq-bdz-iveyi)] Allergy (Verified 07/14/18 09:

26)


 Other-Enter Comments


NSAIDS (Non-Steroidal Anti-Inflamma Allergy (Verified 07/14/18 09:26)


 Anaphylaxis


polymyxin B [From Neosporin (sqk-qfb-daalf)] Allergy (Verified 07/14/18 09:26)


 








Home Medications: 














 Medication  Instructions  Recorded


 


Ipratropium/Albuterol [Combivent 1 inh IH QID #0 09/12/17





Respimat Inhal Spray(*)]  


 


predniSONE 5 mg PO DAILY #0 09/12/17


 


Methotrexate Sodium [Rheumatrex] 17.5 mg PO SA 07/13/18


 


Zolpidem Tartrate [Ambien 5MG (*)] 2.5 mg PO HS 07/14/18














Medical Decision Making





- Diagnostics


EKG Interpretation: 





An EKG obtained and was read and documented in trace view.  Please see trace 

view for full reading and report.  Nonspecific T-wave abnormalities no previous 

for comparison 


ED Course/Re-evaluation: 





Patient is at high risk for C diff colitis.  She is clinically dehydrated.  She 

will likely require admission.





Patient is improving to some degree but has had normal electrolytes.  Her 

potassium is slightly low.  EKG shows some nonspecific T-wave abnormalities.  

Will treat with oral potassium.  Paramedics are here to take her for admission.


Differential Diagnosis: 





C difficile, dehydration, electrolyte abnormality, obstruction, ischemia





- Data Points


Microbiology Results: 


 MICROBIOLOGY





07/13/18 21:33   Stool   Gastrointestinal Tract Panel (PCR) - Final


                            Clostridium Difficile Detected





Medications Given: 


 





Albuterol/Ipratropium (Combivent Respimat Inhal Spray)  1 inh IH QID [facility]


   Stop: 01/10/19 11:59


   Last Admin: 07/14/18 11:25 Dose:  1 puffs


Enoxaparin Sodium (Lovenox)  40 mg SC DAILY ASHLEY


   Stop: 01/10/19 08:59


   Last Admin: 07/14/18 07:57 Dose:  40 mg


Potassium Chloride 40 meq/ (Sodium Chloride)  1,000 mls @ 100 mls/hr IV CONT ASHLEY


   Stop: 01/10/19 00:29


   Last Admin: 07/14/18 11:46 Dose:  1,000 mls


Prednisone (Prednisone)  2.5 mg PO DAILY ASHLEY


   Stop: 01/10/19 08:59


   Last Admin: 07/14/18 09:18 Dose:  2.5 mg


Vancomycin HCl (Vancocin Oral Liquid)  125 mg PO QID ASHLEY


   PRN Reason: Protocol


   Stop: 08/13/18 08:35


   Last Admin: 07/14/18 11:44 Dose:  125 mg





Discontinued Medications





Sodium Chloride (Ns)  1,000 mls @ 0 mls/hr IV EDNOW ONE; Wide Open


   PRN Reason: Protocol


   Stop: 07/13/18 20:50


   Last Admin: 07/13/18 21:20 Dose:  1,000 mls


Sodium Chloride (Ns)  1,000 mls @ 0 mls/hr IV EDNOW ONE; Wide Open


   PRN Reason: Protocol


   Stop: 07/13/18 20:50


   Last Admin: 07/13/18 21:20 Dose:  1,000 mls


Potassium Chloride (Potassium Chloride Oral Liquid)  20 meq PO EDNOW ONE


   Stop: 07/13/18 22:37


   Last Admin: 07/13/18 22:40 Dose:  Not Given


Potassium Chloride (Klor-Con)  20 meq PO EDNOW ONE


   Stop: 07/13/18 22:39


   Last Admin: 07/13/18 22:43 Dose:  20 meq





Point of Care Test Results: 


 CBC











CBC Collection Date            07/13/18


 


CBC Collection Time            21:15


 


WBC                            19.9


 


RBC                            5.55


 


HGB                            17.1


 


HCT                            50.8


 


PLT                            189


 


Neut #                         17.8


 


Neut                           89.3


 


LYMPH #                        1.3


 


LYMPH                          6.5


 


Other WBC #                    0.8


 


Other WBC                      4.2


 


MCV                            91.5











 Chemistry











  07/13/18





  21:20


 


POC Sodium  138 mEq/L mEq/L





   (135-145) 


 


POC Potassium  2.9 mEq/L L mEq/L





   (3.3-5.0) 


 


POC Chloride  100.0 mEq/L mEq/L





   () 


 


POC Total CO2  18 mEq/L L mEq/L





   (22-31) 


 


POC BUN  29 mg/dL H mg/dL





   (7-23) 


 


POC Creatinine  3.1 mg/dL H mg/dL





   (0.6-1.0) 


 


POC Glucose  109 mg/dL H mg/dL





   () 


 


POC Calcium  9.1 mg/dL mg/dL





   (8.5-10.4) 


 


POC Total Bilirubin  1.1 mg/dL mg/dL





   (0.1-1.4) 


 


POC AST  24 IU/L IU/L





   (14-46) 


 


POC ALT  19 IU/L IU/L





   (9-52) 


 


POC Alk Phosphatase  73 IU/L IU/L





   () 


 


POC Total Protein  6.6 g/dL g/dL





   (6.3-8.2) 


 


POC Albumin  3.9 g/dL g/dL





   (3.5-5.0) 














Departure





- Departure


Disposition: [facility]


Clinical Impression: 


 Hypokalemia, Renal insufficiency





Diarrhea


Qualifiers:


 Diarrhea type: infectious Qualified Code(s): A09 - Infectious gastroenteritis 

and colitis, unspecified





Condition: Fair

## 2018-07-13 NOTE — CPEKG
Heart Rate: 86

RR Interval: 698

P-R Interval: 142

QRSD Interval: 96

QT Interval: 372

QTC Interval: 445

P Axis: 0

QRS Axis: 47

T Wave Axis: 207

EKG Severity - ABNORMAL ECG -

EKG Impression: SINUS RHYTHM

EKG Impression: Were nonspecific T-wave abnormalities

Electronically Signed By: Ashkan Fischer 13-Jul-2018 22:14:37

## 2018-07-14 LAB — PLATELET # BLD: 164 10^3/UL (ref 150–400)

## 2018-07-14 RX ADMIN — VANCOMYCIN HYDROCHLORIDE SCH MG: KIT at 11:44

## 2018-07-14 RX ADMIN — IPRATROPIUM BROMIDE AND ALBUTEROL SCH PUFFS: 20; 100 SPRAY, METERED RESPIRATORY (INHALATION) at 16:39

## 2018-07-14 RX ADMIN — SODIUM CHLORIDE SCH MLS: 900 INJECTION, SOLUTION INTRAVENOUS at 01:53

## 2018-07-14 RX ADMIN — SODIUM CHLORIDE SCH MLS: 900 INJECTION, SOLUTION INTRAVENOUS at 11:46

## 2018-07-14 RX ADMIN — ENOXAPARIN SODIUM SCH MG: 100 INJECTION SUBCUTANEOUS at 07:57

## 2018-07-14 RX ADMIN — VANCOMYCIN HYDROCHLORIDE SCH MG: KIT at 15:49

## 2018-07-14 RX ADMIN — VANCOMYCIN HYDROCHLORIDE SCH MG: KIT at 20:08

## 2018-07-14 RX ADMIN — IPRATROPIUM BROMIDE AND ALBUTEROL SCH PUFFS: 20; 100 SPRAY, METERED RESPIRATORY (INHALATION) at 20:13

## 2018-07-14 RX ADMIN — IPRATROPIUM BROMIDE AND ALBUTEROL SCH PUFFS: 20; 100 SPRAY, METERED RESPIRATORY (INHALATION) at 11:25

## 2018-07-14 RX ADMIN — VANCOMYCIN HYDROCHLORIDE SCH MG: KIT at 09:17

## 2018-07-14 RX ADMIN — SODIUM CHLORIDE SCH MLS: 900 INJECTION, SOLUTION INTRAVENOUS at 20:08

## 2018-07-14 NOTE — HOSPPROG
Hospitalist Progress Note


Assessment/Plan: 





C diff diarrhea - start po vanc





ROMEO - Cr 3.1 --> 1.6 after IVF's, suspect pre-renal state 2/2 severe diarrhea


- cont to trend


- avoid nephrotoxic agents, renally dose medications





COPD - No e/o acute exacerbation, continue home medications.





RA - hold MTX, cont 1/2 dose prednisone for now during acute illness.  Resume 

outpt Pred dose of 5 mg daily at d/c.





Diet - Regular


Code - Full


Ppx - LMWH


Dispo - cont inpt


Subjective: Pt feels a little better this afternoon.  Diarrhea may be slowing 

down a little, still frequent stools.  No fevers/chills.  No abdominal pain.


Objective: 


 Vital Signs











Temp Pulse Resp BP Pulse Ox


 


 36.5 C   69   14   109/56 L  96 


 


 07/14/18 11:20  07/14/18 11:26  07/14/18 11:26  07/14/18 11:20  07/14/18 11:26








 Laboratory Results





 07/14/18 05:10 





 07/14/18 05:10 





 











 07/13/18 07/14/18 07/15/18





 05:59 05:59 05:59


 


Intake Total  410 


 


Balance  410 














- Physical Exam


Constitutional: no apparent distress


Eyes: PERRL


Ears, Nose, Mouth, Throat: moist mucous membranes


Cardiovascular: regular rate and rhythym


Respiratory: no respiratory distress, clear to auscultation


Gastrointestinal: normoactive bowel sounds, soft, non-tender abdomen


Skin: warm


Musculoskeletal: full muscle strength


Neurologic: AAOx3


Psychiatric: interacting appropriately





ICD10 Worksheet


Patient Problems: 


 Problems











Problem Status Onset


 


Diarrhea Acute  


 


Hypokalemia Acute  


 


Renal insufficiency Acute  


 


Melena Acute

## 2018-07-14 NOTE — ASMTCMCOM
CM Note

 

CM Note                       

Notes:

Pt admitted to hospital for diarrhea, pt positive for cdiff. Pt lives indepedently and ambulates by 


herself, no therapies ordered. Anticipate she will dc independent when medically stable. CM 

available for any changes.



DC Plan: Indepedent

 

Date Signed:  07/14/2018 01:30 PM

Electronically Signed By:Cleo Osuna RN

## 2018-07-14 NOTE — PDMN
Medical Necessity


Medical necessity: est los>2mn for diarrhea, suspicious for c diff, ROMEO r/t 

severe diarrhea vs AIN w/recent multiple abx use; admit for IVF, follow labs, 

avoid nephrotoxic agents; comorbid COPD, RA on methotrexate and prednisone, 

recent RLE wound; per order and H&P 7/13/18

## 2018-07-14 NOTE — PDGENHP
History and Physical





- Chief Complaint


Diarrhea





- History of Present Illness


76 yo F w/ hx of RA and COPD presents with diarrhea. Patient has had 10-15 daily

, loose BMs for about 4 days. She denies fevers, abdominal pain, or cramping. 

Of note, she has undergone 3 different antibiotic courses for RLE wound. She 

recalls taking levofloxacin and clindamycin, does not recall the third. Her 

last antibiotic dose was about 2 weeks ago. 





She denies any prior history of infectious diarrhea, including C. Diff. Work-up 

in the [facility] was notable for ROMEO and hypokalemia. 





At the time of my evaluation is feeling better after IVF and has not had a BM 

in several hours. 





Case discussed with Dr. Smith; previous records reviewed.





History Information





- Allergies/Home Medication List


Allergies/Adverse Reactions: 








bacitracin [From Neosporin ([id number])] Allergy (Verified 09/12/17 15:17)


 


bacitracin zinc [From Neosporin ([id number]-[id number])] Allergy (Verified 09/12/17 15:

17)


 


neomycin sulfate [From Neosporin ([id number])] Allergy (Verified 09/12/17 15:

17)


 Other-Enter Comments


NSAIDS (Non-Steroidal Anti-Inflamma Allergy (Verified 09/12/17 15:17)


 Anaphylaxis


polymyxin B [From Neosporin ([id number])] Allergy (Verified 09/12/17 15:17)


 





Home Medications: 








Ipratropium/Albuterol [Combivent Respimat Inhal [place](*)] 1 inh IH BID #0 09/12/ 17 [Last Taken 03/13/18 08:30]


Methotrexate Sodium [Rheumatrex] 17.5 mg PO SA@0700 #0 09/12/17 [Last Taken 03/

10/18]


predniSONE 5 mg PO DAILY #0 09/12/17 [Last Taken 03/12/18]


Methotrexate  07/13/18 [Last Taken Unknown]





I have personally reviewed and updated: family history, medical history





- Past Medical History


COPD


Additional medical history: RA





- Surgical History


Reports: cholecystectomy, hysterectomy





- Family History


Positive for: CAD





- Social History


Smoking Status: Heavy smoker


Alcohol Use: Sober


Drug Use: None





Review of Systems


Review of Systems: 





ROS: 10pt was reviewed & negative except for what was stated in HPI & below





Physical Exam


Physical Exam: 

















Temp Pulse Resp BP Pulse Ox


 


 36.8 C   74   16   116/69   97 


 


 07/13/18 23:41  07/13/18 23:41  07/13/18 23:41  07/13/18 23:41  07/13/18 23:41











Constitutional: no apparent distress, not in pain


Eyes: PERRL, EOMI


Ears, Nose, Mouth, Throat: moist mucous membranes, no oral mucosal ulcers


Cardiovascular: regular rate and rhythym, no murmur, rub, or gallop


Respiratory: no respiratory distress, clear to auscultation


Gastrointestinal: normoactive bowel sounds, soft, non-tender abdomen


Skin: warm, normal color


Musculoskeletal: full muscle strength, no muscle tenderness


Neurologic: AAOx3, CN II-XII Intact


Psychiatric: interacting appropriately, not anxious





Lab Data & Imaging Review














POC Sodium  138 mEq/L (135-145)   07/13/18  21:20    


 


POC Potassium  2.9 mEq/L (3.3-5.0)  L  07/13/18  21:20    


 


POC Chloride  100.0 mEq/L ()   07/13/18  21:20    


 


POC Total CO2  18 mEq/L (22-31)  L  07/13/18  21:20    


 


POC BUN  29 mg/dL (7-23)  H  07/13/18  21:20    


 


POC Creatinine  3.1 mg/dL (0.6-1.0)  H  07/13/18  21:20    


 


POC Glucose  109 mg/dL ()  H  07/13/18  21:20    


 


POC Calcium  9.1 mg/dL (8.5-10.4)   07/13/18  21:20    


 


POC Total Bilirubin  1.1 mg/dL (0.1-1.4)   07/13/18  21:20    


 


POC AST  24 IU/L (14-46)   07/13/18  21:20    


 


POC ALT  19 IU/L (9-52)   07/13/18  21:20    


 


POC Alk Phosphatase  73 IU/L ()   07/13/18  21:20    


 


POC Total Protein  6.6 g/dL (6.3-8.2)   07/13/18  21:20    


 


POC Albumin  3.9 g/dL (3.5-5.0)   07/13/18  21:20    








Visualized and Interpreted EKG results: Yes


EKG Interpretation: Positive for: normal sinsus rhythm, NS ST wave abnormalities





Assessment & Plan


Assessment: 








76 yo F w/ RA and COPD presents with likely infectious diarrhea after various 

courses of antibiotics.








Plan: 


1. Diarrhea - Suspicious for C. Diff noting various courses of recent 

antibiotics, including clindamycin. 


- GI PCR pending


- IVF


2. ROMEO - Cr 3.1 on admission increased from normal baseline. This is likely pre-

renal azotemia from severe diarrhea, other possibility would be AIN from 

various recent antibiotics.


- Will check UA, FeNa


- S/p 2 L IVF, will continue mIVF overnight and repeat BMP in the morning


- Avoid nephrotoxic agents, renally dose medications


3. COPD - No e/o acute exacerbation, continue home medications.


4. RA - On methotrexate and prednisone as an outpatient, needs med 

reconciliation.





Diet - Regular


Code - Full


Ppx - LMWH


Dispo - Admit under inpatient status noting severity of diarrhea and renal 

dysfunction.

## 2018-07-15 VITALS — SYSTOLIC BLOOD PRESSURE: 159 MMHG | DIASTOLIC BLOOD PRESSURE: 78 MMHG

## 2018-07-15 LAB — PLATELET # BLD: 151 10^3/UL (ref 150–400)

## 2018-07-15 RX ADMIN — VANCOMYCIN HYDROCHLORIDE SCH MG: KIT at 12:15

## 2018-07-15 RX ADMIN — ENOXAPARIN SODIUM SCH MG: 100 INJECTION SUBCUTANEOUS at 08:21

## 2018-07-15 RX ADMIN — IPRATROPIUM BROMIDE AND ALBUTEROL SCH PUFFS: 20; 100 SPRAY, METERED RESPIRATORY (INHALATION) at 05:15

## 2018-07-15 RX ADMIN — SODIUM CHLORIDE SCH MLS: 900 INJECTION, SOLUTION INTRAVENOUS at 08:19

## 2018-07-15 RX ADMIN — IPRATROPIUM BROMIDE AND ALBUTEROL SCH PUFFS: 20; 100 SPRAY, METERED RESPIRATORY (INHALATION) at 12:08

## 2018-07-15 RX ADMIN — VANCOMYCIN HYDROCHLORIDE SCH MG: KIT at 05:15

## 2018-07-15 NOTE — GDS
[f rep st]



                                                             DISCHARGE SUMMARY





DISCHARGE DIAGNOSES:  

1.  Clostridium difficile diarrhea.

2.  Acute kidney injury, resolved.

3.  Chronic obstructive pulmonary disease, stable.

4.  Rheumatoid arthritis.

5.  Chronic immunosuppression with methotrexate and chronic prednisone.



HISTORY:  For details, please see history and physical dated July 14, 2018.  In brief, the patient is
 a 77-year-old female with history of rheumatoid arthritis on chronic immunosuppressive therapy, as w
ell as COPD, who presented to the emergency room with diarrhea.  There was immediately suspicion for 
C diff diarrhea and she was admitted to the hospital for further management.



HOSPITAL COURSE:  The patient was admitted to the med/surg unit.  A GI pathogen panel indeed was posi
tive for C diff.  She did recently take 2 courses of antibiotics for right lower extremity wound, inc
luding Levaquin and clindamycin.  She was started on oral vancomycin.  Upon arrival, her creatinine w
as 3.1.  This was thought likely to be a prerenal injury due to volume depletion in the setting of se
cate diarrhea.  She was aggressively volume repleted with IV fluids and her creatinine normalized to 
0.6 on the day of discharge.  There was no evidence of acute exacerbation of her COPD.  Her methotrex
ate was held given her acute infection and she was given half dose of her daily prednisone.  She will
 continue her usual 5 mg of prednisone at discharge.  In addition, she is advised that it is importan
t to complete entire 2-week treatment course of oral vancomycin to prevent recurrence.



DISPOSITION:  Patient is discharged home in stable condition.



FOLLOWUP:  Dr. Dionicio Dial, primary care.



DISCHARGE MEDICATIONS:  Please see [facility] for completed outpatient medication list.  New medication
s on discharge include vancomycin 125 mg p.o. q.i.d. 130 mL.  No refills.  She will continue all othe
r outpatient medications as previously prescribed.





Job #:  [id number]

## 2018-10-12 ENCOUNTER — HOSPITAL ENCOUNTER (OUTPATIENT)
Dept: HOSPITAL 80 - FIMAGING | Age: 78
End: 2018-10-12
Payer: COMMERCIAL

## 2018-10-12 DIAGNOSIS — K86.2: Primary | ICD-10-CM

## 2018-10-12 DIAGNOSIS — K57.30: ICD-10-CM

## 2018-10-12 DIAGNOSIS — I77.819: ICD-10-CM

## 2018-10-12 PROCEDURE — 74160 CT ABDOMEN W/CONTRAST: CPT

## 2018-12-06 ENCOUNTER — HOSPITAL ENCOUNTER (OUTPATIENT)
Dept: HOSPITAL 80 - FIMAGING | Age: 78
End: 2018-12-06
Payer: COMMERCIAL

## 2018-12-06 DIAGNOSIS — K13.70: Primary | ICD-10-CM

## 2018-12-06 PROCEDURE — 70487 CT MAXILLOFACIAL W/DYE: CPT

## 2019-02-26 ENCOUNTER — HOSPITAL ENCOUNTER (OUTPATIENT)
Dept: HOSPITAL 80 - BMCIMAGING | Age: 79
End: 2019-02-26
Attending: INTERNAL MEDICINE
Payer: COMMERCIAL

## 2019-02-26 DIAGNOSIS — Z12.31: Primary | ICD-10-CM

## 2019-04-10 ENCOUNTER — HOSPITAL ENCOUNTER (OUTPATIENT)
Dept: HOSPITAL 80 - FIMAGING | Age: 79
End: 2019-04-10
Payer: COMMERCIAL

## 2019-04-10 DIAGNOSIS — K86.2: Primary | ICD-10-CM

## 2019-04-10 DIAGNOSIS — K76.89: ICD-10-CM

## 2019-04-10 PROCEDURE — A9585 GADOBUTROL INJECTION: HCPCS

## 2019-04-10 PROCEDURE — 74183 MRI ABD W/O CNTR FLWD CNTR: CPT
